# Patient Record
Sex: FEMALE | Race: WHITE
[De-identification: names, ages, dates, MRNs, and addresses within clinical notes are randomized per-mention and may not be internally consistent; named-entity substitution may affect disease eponyms.]

---

## 2020-02-14 ENCOUNTER — HOSPITAL ENCOUNTER (OUTPATIENT)
Dept: HOSPITAL 59 - SUR | Age: 50
Discharge: HOME | End: 2020-02-14
Attending: ORTHOPAEDIC SURGERY
Payer: COMMERCIAL

## 2020-02-14 DIAGNOSIS — M65.4: Primary | ICD-10-CM

## 2020-02-14 DIAGNOSIS — I47.1: ICD-10-CM

## 2020-02-14 NOTE — OPERATIVE NOTE
DATE OF SURGERY;  02/14/2020



SURGEON:  Joby Shannon D.O.



PREOPERATIVE DIAGNOSIS:

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  



POSTOPERATIVE DIAGNOSIS:

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  



OPERATION:

DE QUERVAIN'S FASCIOTOMY OF THE LEFT WRIST USING 3.5 LOUPE MAGNIFICATION.  



DESCRIPTION:  This 49-year-old female was taken to the Operating Room and placed
in the supine position on the operating room table.  A general anesthetic was 
administered.  The left upper extremity was elevated, prepped with Hibiclens, 
and draped in the usual sterile fashion.  It was exsanguinated and the 
tourniquet was inflated to 200 mmHg.  



A transverse incision was made 1 cm proximal to the tip of the radial styloid 
along the radial aspect of the wrist.  Dissection was carried down through the 
skin and subcutaneous tissue.   We immediately identified the radial nerve and 
protected it, the proximal edge of the first dorsal compartment was easily 
identified, it was then incised from its proximal to its distal margin and 
marked hyperemia and swelling of the tenosynovium and an increased amount of 
fluid was seen within the compartment.  The extensor brevis and abductor longus 
tendons were entirely normal.  No defect in the tendons whatsoever.  With the 
canal freely opened the wound was irrigated and subsequently the subcutaneous 
tissue was closed with 4-0 Vicryl and the skin with a running subcuticular 4-0 
nylon suture.  Sterile dressings were applied and the patient was taken to the 
Recovery Room in satisfactory condition.  



GROSS PATHOLOGY:  This patient demonstrated marked thickening of the 
tenosynovium with hyperemia also being identified, increased fluid was noted 
within the first dorsal compartment.  



JOB NUMBER:  [id number]

[facility]